# Patient Record
Sex: FEMALE | Race: BLACK OR AFRICAN AMERICAN | URBAN - NONMETROPOLITAN AREA
[De-identification: names, ages, dates, MRNs, and addresses within clinical notes are randomized per-mention and may not be internally consistent; named-entity substitution may affect disease eponyms.]

---

## 2022-10-12 ENCOUNTER — OFFICE VISIT (OUTPATIENT)
Dept: FAMILY MEDICINE CLINIC | Age: 32
End: 2022-10-12
Payer: MEDICAID

## 2022-10-12 VITALS
WEIGHT: 127.8 LBS | BODY MASS INDEX: 21.29 KG/M2 | TEMPERATURE: 98.1 F | OXYGEN SATURATION: 99 % | SYSTOLIC BLOOD PRESSURE: 134 MMHG | DIASTOLIC BLOOD PRESSURE: 88 MMHG | HEART RATE: 91 BPM | HEIGHT: 65 IN

## 2022-10-12 DIAGNOSIS — N10 ACUTE PYELONEPHRITIS: Primary | ICD-10-CM

## 2022-10-12 DIAGNOSIS — F17.200 NICOTINE USE DISORDER: ICD-10-CM

## 2022-10-12 PROCEDURE — 99203 OFFICE O/P NEW LOW 30 MIN: CPT | Performed by: STUDENT IN AN ORGANIZED HEALTH CARE EDUCATION/TRAINING PROGRAM

## 2022-10-12 RX ORDER — LEVOFLOXACIN 750 MG/1
750 TABLET ORAL DAILY
COMMUNITY
Start: 2022-10-07 | End: 2022-10-15

## 2022-10-12 NOTE — PROGRESS NOTES
Adrian Pace presents today for   Chief Complaint   Patient presents with    [facility] Care    ED Follow-up       Is someone accompanying this pt? No  Is the patient using any DME equipment during OV? No     Depression Screening:  3 most recent PHQ Screens 10/12/2022   Little interest or pleasure in doing things Not at all   Feeling down, depressed, irritable, or hopeless Not at all   Total Score PHQ 2 0       Learning Assessment:  Learning Assessment 10/12/2022   PRIMARY LEARNER Patient   HIGHEST LEVEL OF EDUCATION - PRIMARY LEARNER  DID NOT GRADUATE HIGH SCHOOL   BARRIERS PRIMARY LEARNER NONE   CO-LEARNER CAREGIVER No   PRIMARY LANGUAGE ENGLISH   LEARNER PREFERENCE PRIMARY DEMONSTRATION   ANSWERED BY patient   RELATIONSHIP SELF       Fall Risk  No flowsheet data found. ADL  ADL Assessment 10/12/2022   Feeding yourself No Help Needed   Getting from bed to chair No Help Needed   Getting dressed No Help Needed   Bathing or showering No Help Needed   Walk across the room (includes cane/walker) No Help Needed   Using the telphone No Help Needed   Taking your medications No Help Needed   Preparing meals No Help Needed   Managing money (expenses/bills) No Help Needed   Moderately strenuous housework (laundry) No Help Needed   Shopping for personal items (toiletries/medicines) No Help Needed   Shopping for groceries No Help Needed   Driving No Help Needed   Climbing a flight of stairs No Help Needed   Getting to places beyond walking distances No Help Needed       Health Maintenance reviewed and discussed and ordered per Provider. Health Maintenance Due   Topic Date Due    Hepatitis C Screening  Never done    Depression Screen  Never done    COVID-19 Vaccine (1) Never done    DTaP/Tdap/Td series (1 - Tdap) Never done    Cervical cancer screen  Never done    Flu Vaccine (1) Never done   . Coordination of Care:  1. \"Have you been to the ER, urgent care clinic since your last visit?   Hospitalized since your last visit? \" Yes Where: [facility]  Reason for visit: Kidney Infection     2. \"Have you seen or consulted any other health care providers outside of the [address] [place] [address] since your last visit? \" No     3. For patients aged 39-70: Has the patient had a colonoscopy? NA - based on age     If the patient is female:    4. For patients aged 41-77: Has the patient had a mammogram within the past 2 years? NA - based on age    11. For patients aged 21-65: Has the patient had a pap smear? Yes - Care Gap present.  Rooming MA/LPN to request most recent results [address]  2021

## 2022-10-12 NOTE — PROGRESS NOTES
Hospital Discharge Transition of Care Note    SUBJECTIVE    Patient presents for hospital follow up. Patient was admitted to [facility]  Date of admission was 8/4/22 to 8/6/22  Hospital discharge diagnoses: acute pyelonephritis  Hospital discharge summary was reviewed. Hospital coarse and discharge recommendations:    Reports she was having back pain. Reports she progressively got worse, got a fever, and started having nausea and vomiting. CT scan showed acute pyelonephritis. She is on day 5/8 of levaquin. We reviewed the recent hospitalization in detail. The patient reports doing much better. The patient denies any complaints at this time. The remaining 10 system review of systems was negative unless otherwise noted. The patients past medical history, allergies, medication list, social history, and family medical history were documented, updated, and reviewed in the chart. OBJECTIVE    Blood pressure 134/88, pulse 91, temperature 98.1 °F (36.7 °C), temperature source Temporal, height 5' 5\" (1.651 m), weight 127 lb 12.8 oz (58 kg), SpO2 99 %. General:  Alert, cooperative, well appearing, in no apparent distress. Head:  Head is symmetric, normocephalic without evidence of trauma or deformity. Eyes:  The conjunctiva are clear and noninjected. CV:  The heart sounds are regular in rate and rhythm. There is a normal S1 and S2. There or no murmurs, rubs, or gallops. Lungs: Inspiratory and expiratory efforts are full and unlabored. Lung sounds are clear and equal to auscultation throughout all lung fields without wheezing, rales, or rhonchi. Skin:  No rashes, no jaundice, cap refill <2 sec. ASSESSMENT / PLAN  1. Acute pyelonephritis  Patient with recent hospitalization for acute pyelonephritis. Patient is on day 5 of 8 of Levaquin. Would recommend she take medication until completion. Reports symptoms have since resolved. 2. Nicotine use disorder  Patient is current smoker.   Discussed risks and benefits of smoking cessation. Patient is hesitant to quit at this time will consider in the future. Follow-up and Dispositions    Return in about 4 weeks (around 11/9/2022) for physical.              All medications were reconciled from hospital discharge.